# Patient Record
Sex: FEMALE | Race: ASIAN | NOT HISPANIC OR LATINO | URBAN - METROPOLITAN AREA
[De-identification: names, ages, dates, MRNs, and addresses within clinical notes are randomized per-mention and may not be internally consistent; named-entity substitution may affect disease eponyms.]

---

## 2020-01-19 ENCOUNTER — EMERGENCY (EMERGENCY)
Facility: HOSPITAL | Age: 60
LOS: 1 days | Discharge: HOME | End: 2020-01-19
Attending: EMERGENCY MEDICINE | Admitting: EMERGENCY MEDICINE
Payer: MEDICAID

## 2020-01-19 VITALS
SYSTOLIC BLOOD PRESSURE: 130 MMHG | TEMPERATURE: 97 F | DIASTOLIC BLOOD PRESSURE: 86 MMHG | RESPIRATION RATE: 18 BRPM | HEART RATE: 74 BPM | OXYGEN SATURATION: 99 %

## 2020-01-19 PROCEDURE — 99282 EMERGENCY DEPT VISIT SF MDM: CPT | Mod: 25

## 2020-01-19 PROCEDURE — 12001 RPR S/N/AX/GEN/TRNK 2.5CM/<: CPT | Mod: F7

## 2020-01-19 RX ORDER — CEPHALEXIN 500 MG
1 CAPSULE ORAL
Qty: 10 | Refills: 0
Start: 2020-01-19 | End: 2020-01-23

## 2020-01-19 RX ORDER — TETANUS TOXOID, REDUCED DIPHTHERIA TOXOID AND ACELLULAR PERTUSSIS VACCINE, ADSORBED 5; 2.5; 8; 8; 2.5 [IU]/.5ML; [IU]/.5ML; UG/.5ML; UG/.5ML; UG/.5ML
0.5 SUSPENSION INTRAMUSCULAR ONCE
Refills: 0 | Status: COMPLETED | OUTPATIENT
Start: 2020-01-19 | End: 2020-01-19

## 2020-01-19 RX ADMIN — TETANUS TOXOID, REDUCED DIPHTHERIA TOXOID AND ACELLULAR PERTUSSIS VACCINE, ADSORBED 0.5 MILLILITER(S): 5; 2.5; 8; 8; 2.5 SUSPENSION INTRAMUSCULAR at 18:43

## 2020-01-19 NOTE — ED PROVIDER NOTE - NSFOLLOWUPINSTRUCTIONS_ED_ALL_ED_FT
Chinese                elevate hand, keep clean and dry, check sound daily for infection, redness swelling pus.   return for wound check in 48 hours.  sutures to be removed in 7 days.  Be aware that the laceration is a flap and sometimes has poor healing. Must follow up carefully.  You received a tetanus shot, your arm may be sore for a few days.                                            ???,??????,???????????,?????  ?48??????????  ????7?????  ???,?????,??????????????  ?????????,?????????????    Tái gaoshou, baochí qingjié ganzào, meitian jianchá shengyin shìfou you ganran, nóngzhong fà hóng.  Zài 48 annamarieMain Line Health/Main Line Hospitalssage son.  Stephanie rogers zài 7 brian lozano.  paty Chilel youshí yùhé bu liáng. Reese hines.  carroll Weinberg.

## 2020-01-19 NOTE — ED PROVIDER NOTE - PHYSICAL EXAMINATION
Skin: +Flap laceration on dorsum of third finger in area of proximal phalanx. Flap seems vital. Minimal amount of bleeding. Fairly superficial. No tendon or bone visible.   MSK: FROM, full sensation. Able to extend and flex all the way. Normal cap refill. Skin: 2 cm +Flap laceration on dorsum of third finger in area of proximal phalanx. Flap seems vital. Minimal amount of bleeding. Fairly superficial. No tendon or bone visible.   MSK: FROM, full sensation. Able to extend and flex all the way. Normal cap refill.

## 2020-01-19 NOTE — ED PROCEDURE NOTE - PROCEDURE ADDITIONAL DETAILS
11 5o interrupted sutures with good approximation of flap. Flap looked pink at end of suturing. Dressed with Bacitracin, Telfa and gauze. 11 5o interrupted sutures with good approximation of flap. Flap looked pink at end of suturing. Dressed with Bacitracin, Telfa and gauze. Minimal blood loss. Still numb.
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
## 2020-01-19 NOTE — ED PROVIDER NOTE - PATIENT PORTAL LINK FT
You can access the FollowMyHealth Patient Portal offered by [facility] by registering at the following website: [email or web address]. By joining [employer]’s FollowMyHealth portal, you will also be able to view your health information using other applications (apps) compatible with our system.

## 2021-10-05 ENCOUNTER — EMERGENCY (EMERGENCY)
Facility: HOSPITAL | Age: 61
LOS: 1 days | Discharge: LEFT BEFORE DISCHARGE | End: 2021-10-05
Attending: EMERGENCY MEDICINE
Payer: COMMERCIAL

## 2021-10-05 ENCOUNTER — EMERGENCY (EMERGENCY)
Facility: HOSPITAL | Age: 61
LOS: 1 days | Discharge: HOME | End: 2021-10-05
Attending: EMERGENCY MEDICINE
Payer: MEDICAID

## 2021-10-05 VITALS
HEART RATE: 69 BPM | TEMPERATURE: 98 F | DIASTOLIC BLOOD PRESSURE: 79 MMHG | RESPIRATION RATE: 16 BRPM | OXYGEN SATURATION: 98 % | SYSTOLIC BLOOD PRESSURE: 116 MMHG

## 2021-10-05 VITALS
HEART RATE: 88 BPM | SYSTOLIC BLOOD PRESSURE: 127 MMHG | DIASTOLIC BLOOD PRESSURE: 83 MMHG | WEIGHT: 169.98 LBS | RESPIRATION RATE: 18 BRPM | OXYGEN SATURATION: 97 % | TEMPERATURE: 98 F | HEIGHT: 65 IN

## 2021-10-05 VITALS
OXYGEN SATURATION: 97 % | HEIGHT: 65 IN | HEART RATE: 100 BPM | WEIGHT: 169.98 LBS | SYSTOLIC BLOOD PRESSURE: 156 MMHG | DIASTOLIC BLOOD PRESSURE: 94 MMHG | TEMPERATURE: 98 F | RESPIRATION RATE: 18 BRPM

## 2021-10-05 PROCEDURE — 73562 X-RAY EXAM OF KNEE 3: CPT

## 2021-10-05 PROCEDURE — 99284 EMERGENCY DEPT VISIT MOD MDM: CPT

## 2021-10-05 PROCEDURE — 73590 X-RAY EXAM OF LOWER LEG: CPT

## 2021-10-05 PROCEDURE — 73590 X-RAY EXAM OF LOWER LEG: CPT | Mod: 26,50

## 2021-10-05 PROCEDURE — 73562 X-RAY EXAM OF KNEE 3: CPT | Mod: 26,LT

## 2021-10-05 PROCEDURE — 99284 EMERGENCY DEPT VISIT MOD MDM: CPT | Mod: 25

## 2021-10-05 PROCEDURE — 99283 EMERGENCY DEPT VISIT LOW MDM: CPT

## 2021-10-05 RX ORDER — IBUPROFEN 200 MG
400 TABLET ORAL ONCE
Refills: 0 | Status: COMPLETED | OUTPATIENT
Start: 2021-10-05 | End: 2021-10-05

## 2021-10-05 RX ORDER — ACETAMINOPHEN 500 MG
975 TABLET ORAL ONCE
Refills: 0 | Status: DISCONTINUED | OUTPATIENT
Start: 2021-10-05 | End: 2021-10-05

## 2021-10-05 RX ORDER — ACETAMINOPHEN 500 MG
650 TABLET ORAL ONCE
Refills: 0 | Status: DISCONTINUED | OUTPATIENT
Start: 2021-10-05 | End: 2021-10-09

## 2021-10-05 RX ADMIN — Medication 400 MILLIGRAM(S): at 22:53

## 2021-10-05 NOTE — ED PROVIDER NOTE - PATIENT PORTAL LINK FT
You can access the FollowMyHealth Patient Portal offered by [facility] by registering at the following website: [email or web address]. By joining [employer]’s FollowMyHealth portal, you will also be able to view your health information using other applications (apps) compatible with our system.
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
## 2021-10-05 NOTE — ED PROVIDER NOTE - NSFOLLOWUPINSTRUCTIONS_ED_ALL_ED_FT
You were evaluated in the Emergency Department for leg injuries.  You were evaluated and examined by a physician, and you had x-rays.      Based on your evaluation: there is a small fracture on the left knee; you will need to see an orthopedic surgeon for follow up.    We recommend that you:  1. See your primary care physician within the next 72 hours for follow up.  Bring a copy of your discharge paperwork (including any test results) to your doctor.  2. Take ibuprofen 400mg every 6 hours as needed for pain.   3. use crutches and knee immobilizer, as instructed.  4. See an orthopedic surgeon within 1-2 weeks.      *** Return immediately if you have worsening pain, inability to walk, or any other new/concerning symptoms. ***      Isamar kongi jízhen ke jieshòule tui bù shòushang pínggu. Yisheng duì nín jìnxíngle pínggu marielos jianchá, andrew jìnxíngle X manny jianchá.    Genjù nín de pínggu: Gi xi you zhang guzhé; isamar xuyào nato kàn guke yisheng jìnxíng suífang.    Women denisenynidhi penn:  1. Zài devin xiàlái de 72 xiaoshí nèi liánxì nín de chují baojiàn yisheng jìnxíng suífang. Oneil nín de chuyuàn wénjiàn fùben (baokuò rènhé cèshì jiéguo) dài gei nín de yisheng.  2. Téngtòng shí masood 6 xiaoshí fúyòng 400 háokè sandor luò fen.  3. Ànzhào shuomíng shiyòng guaizhàng  andriy go qì.  4. 1-2 Gallardo nèi qù kàn guke yisheng.      *** Rúguo nín de téngtòng jiajù, wúfa xíngzou huò rènhé qíta nando de/xiangbashir oquendoàng, donavon lind. ***

## 2021-10-05 NOTE — ED PROVIDER NOTE - NS ED ROS FT
Review of Systems:  -General: no fever   -Pulmonary: no shortness of breath  -Cardiac: no chest pain  -Gastrointestinal: no abdominal pain, no nausea, no vomiting  -Musculoskeletal: no back or neck pain; + leg pain (bilateral - see hpi)  -Skin: no rashes; + bruising on legs, ~1cm abrasion to proximal right shin without active bleeding  -Endocrine: No h/o diabetes   -Neurologic: No weakness or numbness in extremities

## 2021-10-05 NOTE — ED PROVIDER NOTE - NSFOLLOWUPCLINICS_GEN_ALL_ED_FT
[facility] Orthopedic Surgery  Orthopedic Surgery  [address], [address] & [address] [place] [address]  Phone: [phone]  Fax:
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
## 2021-10-05 NOTE — ED PROVIDER NOTE - PHYSICAL EXAMINATION
GEN: Patient awake alert NAD.   HEENT: normocephalic, atraumatic, EOMI, no scleral icterus, moist MM  CARDIAC: RRR, S1, S2, no murmur.   PULM: CTA B/L no wheeze, rhonchi, rales.   ABD: soft NT, ND, no rebound no guarding,   MSK: Moving all extremities, no edema. 5/5 strength and full ROM in all extremities. Compartments soft, NV intact   NEURO: A&Ox3, gait normal, no focal neurological deficits,   SKIN: Ecchymosis to B/L anterior shins and medial L knee joint line, warm, dry, no rash.
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
## 2021-10-05 NOTE — ED ADULT NURSE NOTE - CAS ELECT INFOMATION PROVIDED
Pt discharged using  - pt verbalized understanding of crutch use and ortho follow up./DC instructions
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
## 2021-10-05 NOTE — ED PROVIDER NOTE - PHYSICAL EXAMINATION
On Physical Exam:  General: well appearing, in NAD, speaking clearly in full sentences and without difficulty; cooperative with exam  HEENT: airway patent  Neck: no neck tenderness  Cardiac: normal s1, s2; RRR; no MGR  Lungs: CTABL  Abdomen: soft nontender/nondistended  : no bladder tenderness or distension  Skin: intact, no rash  Extremities:   -LLE: multiple scattered ecchymoses over the anterior and medial knee, and anterior shin; FROM of hip, knee and ankle, knee has no laxity, negative ant/posterior drawer sign; pt/dp pulses present  -RLE: multiple scattered ecchymoses over the anterior shin; FROM of hip, knee and ankle, knee has no laxity, negative ant/posterior drawer sign; dp/pt pulses present  Neuro: no gross neurologic deficits; GCS 15

## 2021-10-08 NOTE — ED POST DISCHARGE NOTE - DETAILS
Spoke to pt using Mandarin  advised of results of spurring at achilles tendon. Informed could indicated tendonitis. Pt w/out any pain or complaints. Aware to follow up with PCP if any discomfort occurs - Kacey Rockwell PA-C

## 2021-10-08 NOTE — ED POST DISCHARGE NOTE - RESULT SUMMARY
Impression: There is no acute fracture or dislocation. The osseous alignment is maintained. There is mild spurring at the Achilles insertion.

## 2021-12-01 PROBLEM — Z78.9 OTHER SPECIFIED HEALTH STATUS: Chronic | Status: ACTIVE | Noted: 2020-01-19
